# Patient Record
Sex: MALE | Race: OTHER
[De-identification: names, ages, dates, MRNs, and addresses within clinical notes are randomized per-mention and may not be internally consistent; named-entity substitution may affect disease eponyms.]

---

## 2018-03-21 ENCOUNTER — HOSPITAL ENCOUNTER (EMERGENCY)
Dept: HOSPITAL 80 - FED | Age: 18
Discharge: HOME | End: 2018-03-21
Payer: COMMERCIAL

## 2018-03-21 VITALS
DIASTOLIC BLOOD PRESSURE: 78 MMHG | HEART RATE: 71 BPM | OXYGEN SATURATION: 96 % | SYSTOLIC BLOOD PRESSURE: 125 MMHG | TEMPERATURE: 98.2 F | RESPIRATION RATE: 18 BRPM

## 2018-03-21 DIAGNOSIS — T78.40XA: Primary | ICD-10-CM

## 2018-03-21 NOTE — EDPHY
H & P


Time Seen by Provider: 03/21/18 19:11


HPI/ROS: 





CHIEF COMPLAINT:  Urticaria





HISTORY OF PRESENT ILLNESS:  17-year-old male presents to the emergency 

department by ambulance with urticaria and feeling tremulous.  The patient has 

an unknown food allergy.  He has had at least 6 episodes of these same symptoms 

of urticaria.  It typically resolves on its own however today he was at school 

and felt extremely anxious after the rash developed and was having difficulty 

walking.  It sounds per EMS that he was extremely anxious and hyperventilating.

  He was given IV Benadryl and IV Solu-Medrol in the emergency department.  He 

was feeling better upon arrival in the emergency department.  No dysphagia.  No 

chest pain or difficulty breathing.  No fevers or chills.





REVIEW OF SYSTEMS:


Constitutional:  No fever, no chills.


Eyes:  No double or blurry vision.


ENT:  No sore throat.


Respiratory:  No cough, no shortness of breath.


Cardiac:  No chest pain.


Gastrointestinal:  No abdominal pain, vomiting or diarrhea.


Genitourinary:  No dysuria.


Musculoskeletal:  No neck or back pain.


Skin:  Rash as above


Neurological:  No headache.


Past Medical/Surgical History: 





Unknown allergy


Social History: 





[employer] student from [employer]


Smoking Status: Never smoked


Physical Exam: 





General Appearance:  Alert, mild distress.  Poor eye contact.  Anxious.


Eyes:  Pupils equal and round.  Extraocular motions are all intact.


ENT:  Mouth:  Mucous membranes moist.  No uvular swelling.  No muffled voice.


Respiratory:  No wheezing, rhonchi, or rales, lungs are clear to auscultation.


Cardiovascular:  Regular rate and rhythm.


Gastrointestinal:  Abdomen is soft and nontender, no masses, no rebound or 

guarding, bowel sounds normal.


Neurological:  Alert and oriented x 3, cranial nerves II through XII grossly 

intact


Skin:  Red raised welts diffusely to upper extremities beverley to the touch.  

There is no vesicles.


Musculoskeletal:  Nontender to palpate along the cervical, thoracic or lumbar 

spine.  Neck is supple.


Extremities:  Full range of motion and no peripheral edema.


Psychiatric:  Patient is oriented X 3, there is no agitation.


Constitutional: 


 Initial Vital Signs











Temperature (C)  36.7 C   03/21/18 19:00


 


Heart Rate  86   03/21/18 19:00


 


Respiratory Rate  16   03/21/18 19:00


 


Blood Pressure  120/78   03/21/18 19:00


 


O2 Sat (%)  100   03/21/18 19:00








 











O2 Delivery Mode               Room Air


 


O2 (L/minute)                  2














Allergies/Adverse Reactions: 


 





No Allergies [NKDA] Allergy (Verified 03/21/18 20:36)


 








Home Medications: 














 Medication  Instructions  Recorded


 


predniSONE 60 mg PO DAILY 3 Days  tab 03/21/18














Medical Decision Making


ED Course/Re-evaluation: 





The patient received 50 mg of IV Benadryl and 125 mg of IV Solu-Medrol by EMS.  

He is feeling better.  His urticaria is fading.  He was given 20 mg of IV 

Pepcid in the emergency department.





Patient was observed for 1 hr 45 min and was feeling much better.  He is 

comfortable being discharged home.  He will be discharged home with prednisone 

to continue for 3 additional days.  I also encouraged him to follow up with an 

allergist to determine this unknown allergy.  He was encouraged to return to 

the emergency department sooner if he had any other change in symptoms or felt 

worse.


Differential Diagnosis: 





Including but not limited to urticaria, allergic reaction, anaphylaxis, anxiety

, hyperventilation





- Data Points


Medications Given: 


 








Discontinued Medications





Famotidine (Pepcid)  20 mg IVP EDNOW ONE


   Stop: 03/21/18 19:22


   Last Admin: 03/21/18 19:43 Dose:  20 mg








Departure





- Departure


Disposition: Home, Routine, Self-Care


Clinical Impression: 


Allergic reaction


Qualifiers:


 Encounter type: initial encounter Qualified Code(s): T78.40XA - Allergy, 

unspecified, initial encounter





Condition: Good


Instructions:  Urticaria (ED), General Allergic Reaction (ED)


Additional Instructions: 


Prednisone for 3 days.  Benadryl as needed for symptoms of itching or rash.  

Return to the emergency department if you develop difficulty breathing, 

difficulty swallowing or any other concerns.


Referrals: 


Temitope Ruth MD [[facility] [place] [facility] [place]] - 2-3 days, call for appt. (

Allergist)


Joseph Lance DO [Medical Doctor] - As per Instructions (Primary care provider 

on call)


Prescriptions: 


predniSONE 60 mg PO DAILY 3 Days  tab